# Patient Record
Sex: FEMALE | Race: WHITE | NOT HISPANIC OR LATINO | Employment: FULL TIME | URBAN - METROPOLITAN AREA
[De-identification: names, ages, dates, MRNs, and addresses within clinical notes are randomized per-mention and may not be internally consistent; named-entity substitution may affect disease eponyms.]

---

## 2017-09-16 ENCOUNTER — HOSPITAL ENCOUNTER (OUTPATIENT)
Dept: RADIOLOGY | Facility: MEDICAL CENTER | Age: 37
End: 2017-09-16
Attending: FAMILY MEDICINE
Payer: COMMERCIAL

## 2017-09-16 ENCOUNTER — OFFICE VISIT (OUTPATIENT)
Dept: URGENT CARE | Facility: CLINIC | Age: 37
End: 2017-09-16
Payer: COMMERCIAL

## 2017-09-16 ENCOUNTER — HOSPITAL ENCOUNTER (OUTPATIENT)
Facility: MEDICAL CENTER | Age: 37
End: 2017-09-16
Attending: FAMILY MEDICINE
Payer: COMMERCIAL

## 2017-09-16 VITALS
OXYGEN SATURATION: 97 % | TEMPERATURE: 97.8 F | SYSTOLIC BLOOD PRESSURE: 118 MMHG | BODY MASS INDEX: 40.73 KG/M2 | HEIGHT: 69 IN | RESPIRATION RATE: 18 BRPM | DIASTOLIC BLOOD PRESSURE: 70 MMHG | HEART RATE: 104 BPM | WEIGHT: 275 LBS

## 2017-09-16 DIAGNOSIS — R10.11 RUQ ABDOMINAL PAIN: ICD-10-CM

## 2017-09-16 DIAGNOSIS — R50.9 FEELS FEVERISH: ICD-10-CM

## 2017-09-16 DIAGNOSIS — R11.2 NAUSEA AND VOMITING, INTRACTABILITY OF VOMITING NOT SPECIFIED, UNSPECIFIED VOMITING TYPE: ICD-10-CM

## 2017-09-16 DIAGNOSIS — R19.7 DIARRHEA, UNSPECIFIED TYPE: ICD-10-CM

## 2017-09-16 LAB
ALBUMIN SERPL BCP-MCNC: 4.6 G/DL (ref 3.2–4.9)
ALBUMIN/GLOB SERPL: 1.5 G/DL
ALP SERPL-CCNC: 73 U/L (ref 30–99)
ALT SERPL-CCNC: 18 U/L (ref 2–50)
ANION GAP SERPL CALC-SCNC: 8 MMOL/L (ref 0–11.9)
APPEARANCE UR: CLEAR
AST SERPL-CCNC: 17 U/L (ref 12–45)
BASOPHILS # BLD AUTO: 0.6 % (ref 0–1.8)
BASOPHILS # BLD: 0.05 K/UL (ref 0–0.12)
BILIRUB SERPL-MCNC: 0.5 MG/DL (ref 0.1–1.5)
BILIRUB UR STRIP-MCNC: ABNORMAL MG/DL
BUN SERPL-MCNC: 13 MG/DL (ref 8–22)
CALCIUM SERPL-MCNC: 9.5 MG/DL (ref 8.5–10.5)
CHLORIDE SERPL-SCNC: 104 MMOL/L (ref 96–112)
CO2 SERPL-SCNC: 25 MMOL/L (ref 20–33)
COLOR UR AUTO: YELLOW
CREAT SERPL-MCNC: 0.76 MG/DL (ref 0.5–1.4)
EOSINOPHIL # BLD AUTO: 0.36 K/UL (ref 0–0.51)
EOSINOPHIL NFR BLD: 4.3 % (ref 0–6.9)
ERYTHROCYTE [DISTWIDTH] IN BLOOD BY AUTOMATED COUNT: 38.8 FL (ref 35.9–50)
GFR SERPL CREATININE-BSD FRML MDRD: >60 ML/MIN/1.73 M 2
GLOBULIN SER CALC-MCNC: 3 G/DL (ref 1.9–3.5)
GLUCOSE SERPL-MCNC: 98 MG/DL (ref 65–99)
GLUCOSE UR STRIP.AUTO-MCNC: ABNORMAL MG/DL
HCT VFR BLD AUTO: 41.5 % (ref 37–47)
HGB BLD-MCNC: 13.7 G/DL (ref 12–16)
IMM GRANULOCYTES # BLD AUTO: 0.03 K/UL (ref 0–0.11)
IMM GRANULOCYTES NFR BLD AUTO: 0.4 % (ref 0–0.9)
INT CON NEG: NEGATIVE
INT CON POS: POSITIVE
KETONES UR STRIP.AUTO-MCNC: 5 MG/DL
LEUKOCYTE ESTERASE UR QL STRIP.AUTO: ABNORMAL
LYMPHOCYTES # BLD AUTO: 2.49 K/UL (ref 1–4.8)
LYMPHOCYTES NFR BLD: 29.7 % (ref 22–41)
MCH RBC QN AUTO: 28.9 PG (ref 27–33)
MCHC RBC AUTO-ENTMCNC: 33 G/DL (ref 33.6–35)
MCV RBC AUTO: 87.6 FL (ref 81.4–97.8)
MONOCYTES # BLD AUTO: 0.51 K/UL (ref 0–0.85)
MONOCYTES NFR BLD AUTO: 6.1 % (ref 0–13.4)
NEUTROPHILS # BLD AUTO: 4.94 K/UL (ref 2–7.15)
NEUTROPHILS NFR BLD: 58.9 % (ref 44–72)
NITRITE UR QL STRIP.AUTO: ABNORMAL
NRBC # BLD AUTO: 0 K/UL
NRBC BLD AUTO-RTO: 0 /100 WBC
PH UR STRIP.AUTO: 8.5 [PH] (ref 5–8)
PLATELET # BLD AUTO: 308 K/UL (ref 164–446)
PMV BLD AUTO: 10.9 FL (ref 9–12.9)
POC URINE PREGNANCY TEST: NORMAL
POTASSIUM SERPL-SCNC: 4.3 MMOL/L (ref 3.6–5.5)
PROT SERPL-MCNC: 7.6 G/DL (ref 6–8.2)
PROT UR QL STRIP: 30 MG/DL
RBC # BLD AUTO: 4.74 M/UL (ref 4.2–5.4)
RBC UR QL AUTO: ABNORMAL
SODIUM SERPL-SCNC: 137 MMOL/L (ref 135–145)
SP GR UR STRIP.AUTO: 1
UROBILINOGEN UR STRIP-MCNC: ABNORMAL MG/DL
WBC # BLD AUTO: 8.4 K/UL (ref 4.8–10.8)

## 2017-09-16 PROCEDURE — 74177 CT ABD & PELVIS W/CONTRAST: CPT

## 2017-09-16 PROCEDURE — 81025 URINE PREGNANCY TEST: CPT | Performed by: FAMILY MEDICINE

## 2017-09-16 PROCEDURE — 99201 PR OFFICE/OUTPT VISIT,NEW,LEVL I: CPT | Performed by: FAMILY MEDICINE

## 2017-09-16 PROCEDURE — 99000 SPECIMEN HANDLING OFFICE-LAB: CPT | Performed by: FAMILY MEDICINE

## 2017-09-16 PROCEDURE — 80053 COMPREHEN METABOLIC PANEL: CPT

## 2017-09-16 PROCEDURE — 36415 COLL VENOUS BLD VENIPUNCTURE: CPT | Performed by: FAMILY MEDICINE

## 2017-09-16 PROCEDURE — 85025 COMPLETE CBC W/AUTO DIFF WBC: CPT

## 2017-09-16 PROCEDURE — 700117 HCHG RX CONTRAST REV CODE 255: Performed by: FAMILY MEDICINE

## 2017-09-16 PROCEDURE — 81002 URINALYSIS NONAUTO W/O SCOPE: CPT | Performed by: FAMILY MEDICINE

## 2017-09-16 RX ORDER — ONDANSETRON HYDROCHLORIDE 8 MG/1
8 TABLET, FILM COATED ORAL EVERY 8 HOURS PRN
Qty: 15 TAB | Refills: 0 | Status: SHIPPED | OUTPATIENT
Start: 2017-09-16 | End: 2021-10-13

## 2017-09-16 RX ORDER — CYCLOBENZAPRINE HCL 5 MG
5-10 TABLET ORAL 3 TIMES DAILY PRN
Qty: 30 TAB | Refills: 0 | Status: ON HOLD | OUTPATIENT
Start: 2017-09-16 | End: 2017-10-12

## 2017-09-16 RX ADMIN — IOHEXOL 100 ML: 350 INJECTION, SOLUTION INTRAVENOUS at 15:30

## 2017-09-16 RX ADMIN — IOHEXOL 100 ML: 240 INJECTION, SOLUTION INTRATHECAL; INTRAVASCULAR; INTRAVENOUS; ORAL at 15:30

## 2017-09-16 ASSESSMENT — PAIN SCALES - GENERAL: PAINLEVEL: 9=SEVERE PAIN

## 2017-09-16 NOTE — PROGRESS NOTES
Subjective:      Fiorella Davis is a 37 y.o. female who presents with RUQ Pain (diarrhea and vomiting, x a month but sometimes is very severe  )    Patient presents to urgent care with right upper quadrant pain off and on for about a month over the past week it has been more consistent and worse. She has altered bowel habits as soon as she eats she either vomits or has diarrhea and feels this right upper quadrant pain. She is recently lost some weigh on [employer]. She has felt feverish and chilled at times. Denies any previous history of any gallbladder problems. She is status post appendectomy. Denies any dysuria or hematuria no history of kidney stones.       HPI  ROS  PMH:  has a past medical history of Anxiety; Depression; and Urinary tract infection, site not specified.  MEDS:   Current Outpatient Prescriptions:   •  ClonazePAM (KLONOPIN PO), Take  by mouth., Disp: , Rfl:   •  Escitalopram Oxalate (LEXAPRO PO), Take  by mouth., Disp: , Rfl:   •  amphetamine-dextroamphetamine (ADDERALL, 20MG,) 20 MG TABS, Take 20 mg by mouth 2 times a day., Disp: , Rfl:   •  zolpidem (AMBIEN) 10 MG TABS, Take 10 mg by mouth at bedtime as needed for Sleep., Disp: , Rfl:   •  antipyrine-benzocaine (AURALGAN) otic solution, , Disp: , Rfl:   •  azithromycin (ZITHROMAX) 500 MG tablet, , Disp: , Rfl:   •  fluoxetine (PROZAC) 10 MG Cap, , Disp: , Rfl:   •  sertraline (ZOLOFT) 50 MG TABS, Take 50 mg by mouth every day., Disp: , Rfl:   •  alprazolam (XANAX) 0.5 MG TABS, Take 0.5 mg by mouth at bedtime as needed., Disp: , Rfl:   •  azithromycin (ZITHROMAX Z-JOSE EDUARDO) 250 MG TABS, Take 1 Tab by mouth every day. Take two tablets the first day.  Dana 2 tabletas la javi shalini.  Patient states that she doesn't have a reaction to Z-Jose Eduardo., Disp: 1 Each, Rfl: 0  •  guaifenesin-codeine (CHERATUSSIN AC) SOLN, Take 5 mL by mouth every four hours as needed for Cough., Disp: 180 mL, Rfl: 0  •  fluticasone (FLONASE) 50 MCG/ACT nasal spray, Spray 1  "Spray in nose every day. Each Nostril, Disp: 1 Bottle, Rfl: 0  •  cetirizine-psuedoephedrine (ZYRTEC-D) 5-120 MG per tablet, Take 1 Tab by mouth 2 times a day., Disp: 60 Tab, Rfl: 0  •  hydrocodone-acetaminophen (VICODIN) 5-500 MG TABS, Take 1 Tab by mouth every four hours as needed., Disp: 15 Each, Rfl: 0  •  benzonatate (TESSALON) 100 MG CAPS, Take 2 Caps by mouth 3 times a day as needed for Cough. On [employer] $4 Plan, Disp: 60 Cap, Rfl: 1  ALLERGIES:   Allergies   Allergen Reactions   • Ceclor [Cefaclor] Vomiting   • Erythromycin Nausea   • Pcn [Penicillins] Swelling   SURGHX:   Past Surgical History:   Procedure Laterality Date   • PRIMARY C SECTION  2014   • APPENDECTOMY  1998   • ACL REPAIR  1997   • KNEE ARTHROSCOPY     SOCHX:  reports that she has never smoked. She has never used smokeless tobacco. She reports that she drinks alcohol. She reports that she does not use drugs.  FH: Family history was reviewed, no pertinent findings to report     Objective:     /70   Pulse (!) 104   Temp 36.6 °C (97.8 °F)   Resp 18   Ht 1.753 m (5' 9\")   Wt 124.7 kg (275 lb)   SpO2 97%   Breastfeeding? No   BMI 40.61 kg/m²      Physical Exam      Diagnostics: urine dip prot, ph 8.5, tr blood, tr ketones                        Hcg negative  Assessment/Plan:         "

## 2017-09-20 ENCOUNTER — HOSPITAL ENCOUNTER (OUTPATIENT)
Dept: LAB | Facility: MEDICAL CENTER | Age: 37
End: 2017-09-20
Attending: PHYSICIAN ASSISTANT
Payer: COMMERCIAL

## 2017-09-20 LAB
BASOPHILS # BLD AUTO: 0.3 % (ref 0–1.8)
BASOPHILS # BLD: 0.03 K/UL (ref 0–0.12)
EOSINOPHIL # BLD AUTO: 0.09 K/UL (ref 0–0.51)
EOSINOPHIL NFR BLD: 0.9 % (ref 0–6.9)
ERYTHROCYTE [DISTWIDTH] IN BLOOD BY AUTOMATED COUNT: 37.8 FL (ref 35.9–50)
HCT VFR BLD AUTO: 40.6 % (ref 37–47)
HGB BLD-MCNC: 13.7 G/DL (ref 12–16)
IMM GRANULOCYTES # BLD AUTO: 0.04 K/UL (ref 0–0.11)
IMM GRANULOCYTES NFR BLD AUTO: 0.4 % (ref 0–0.9)
LYMPHOCYTES # BLD AUTO: 2.43 K/UL (ref 1–4.8)
LYMPHOCYTES NFR BLD: 25.3 % (ref 22–41)
MCH RBC QN AUTO: 29.9 PG (ref 27–33)
MCHC RBC AUTO-ENTMCNC: 33.7 G/DL (ref 33.6–35)
MCV RBC AUTO: 88.6 FL (ref 81.4–97.8)
MONOCYTES # BLD AUTO: 0.43 K/UL (ref 0–0.85)
MONOCYTES NFR BLD AUTO: 4.5 % (ref 0–13.4)
NEUTROPHILS # BLD AUTO: 6.57 K/UL (ref 2–7.15)
NEUTROPHILS NFR BLD: 68.6 % (ref 44–72)
NRBC # BLD AUTO: 0 K/UL
NRBC BLD AUTO-RTO: 0 /100 WBC
PLATELET # BLD AUTO: 311 K/UL (ref 164–446)
PMV BLD AUTO: 11.1 FL (ref 9–12.9)
RBC # BLD AUTO: 4.58 M/UL (ref 4.2–5.4)
WBC # BLD AUTO: 9.6 K/UL (ref 4.8–10.8)

## 2017-09-20 PROCEDURE — 83516 IMMUNOASSAY NONANTIBODY: CPT

## 2017-09-20 PROCEDURE — 82784 ASSAY IGA/IGD/IGG/IGM EACH: CPT

## 2017-09-20 PROCEDURE — 85025 COMPLETE CBC W/AUTO DIFF WBC: CPT

## 2017-09-20 PROCEDURE — 83690 ASSAY OF LIPASE: CPT

## 2017-09-20 PROCEDURE — 36415 COLL VENOUS BLD VENIPUNCTURE: CPT

## 2017-09-20 PROCEDURE — 80053 COMPREHEN METABOLIC PANEL: CPT

## 2017-09-21 ENCOUNTER — HOSPITAL ENCOUNTER (OUTPATIENT)
Facility: MEDICAL CENTER | Age: 37
End: 2017-09-21
Attending: PHYSICIAN ASSISTANT
Payer: COMMERCIAL

## 2017-09-21 ENCOUNTER — HOSPITAL ENCOUNTER (OUTPATIENT)
Dept: RADIOLOGY | Facility: MEDICAL CENTER | Age: 37
End: 2017-09-21
Attending: PHYSICIAN ASSISTANT
Payer: COMMERCIAL

## 2017-09-21 DIAGNOSIS — R10.11 ABDOMINAL PAIN, RIGHT UPPER QUADRANT: ICD-10-CM

## 2017-09-21 DIAGNOSIS — K52.9 INFLAMMATORY BOWEL DISEASE: ICD-10-CM

## 2017-09-21 DIAGNOSIS — R11.12 PROJECTILE VOMITING WITH NAUSEA: ICD-10-CM

## 2017-09-21 LAB
ALBUMIN SERPL BCP-MCNC: 4.8 G/DL (ref 3.2–4.9)
ALBUMIN/GLOB SERPL: 1.8 G/DL
ALP SERPL-CCNC: 66 U/L (ref 30–99)
ALT SERPL-CCNC: 17 U/L (ref 2–50)
ANION GAP SERPL CALC-SCNC: 8 MMOL/L (ref 0–11.9)
AST SERPL-CCNC: 18 U/L (ref 12–45)
BILIRUB SERPL-MCNC: 0.5 MG/DL (ref 0.1–1.5)
BUN SERPL-MCNC: 9 MG/DL (ref 8–22)
CALCIUM SERPL-MCNC: 9.4 MG/DL (ref 8.5–10.5)
CHLORIDE SERPL-SCNC: 103 MMOL/L (ref 96–112)
CO2 SERPL-SCNC: 25 MMOL/L (ref 20–33)
CREAT SERPL-MCNC: 0.87 MG/DL (ref 0.5–1.4)
GFR SERPL CREATININE-BSD FRML MDRD: >60 ML/MIN/1.73 M 2
GLOBULIN SER CALC-MCNC: 2.7 G/DL (ref 1.9–3.5)
GLUCOSE SERPL-MCNC: 79 MG/DL (ref 65–99)
LIPASE SERPL-CCNC: 7 U/L (ref 11–82)
POTASSIUM SERPL-SCNC: 3.8 MMOL/L (ref 3.6–5.5)
PROT SERPL-MCNC: 7.5 G/DL (ref 6–8.2)
SODIUM SERPL-SCNC: 136 MMOL/L (ref 135–145)

## 2017-09-21 PROCEDURE — 76700 US EXAM ABDOM COMPLETE: CPT

## 2017-09-21 PROCEDURE — 87329 GIARDIA AG IA: CPT

## 2017-09-21 PROCEDURE — 87328 CRYPTOSPORIDIUM AG IA: CPT

## 2017-09-21 PROCEDURE — 87493 C DIFF AMPLIFIED PROBE: CPT

## 2017-09-22 LAB
C DIFF DNA SPEC QL NAA+PROBE: NEGATIVE
C DIFF TOX GENS STL QL NAA+PROBE: NEGATIVE
G LAMBLIA+C PARVUM AG STL QL RAPID: NORMAL
IGA SERPL-MCNC: 178 MG/DL (ref 68–408)
SIGNIFICANT IND 70042: NORMAL
SOURCE SOURCE: NORMAL
TTG IGA SER IA-ACNC: 1 U/ML (ref 0–3)

## 2017-10-02 ENCOUNTER — HOSPITAL ENCOUNTER (OUTPATIENT)
Dept: RADIOLOGY | Facility: MEDICAL CENTER | Age: 37
End: 2017-10-02
Attending: PHYSICIAN ASSISTANT
Payer: COMMERCIAL

## 2017-10-02 DIAGNOSIS — R10.11 ABDOMINAL PAIN, RIGHT UPPER QUADRANT: ICD-10-CM

## 2017-10-02 PROCEDURE — 78226 HEPATOBILIARY SYSTEM IMAGING: CPT

## 2017-10-12 ENCOUNTER — HOSPITAL ENCOUNTER (OUTPATIENT)
Facility: MEDICAL CENTER | Age: 37
End: 2017-10-12
Attending: SURGERY | Admitting: SURGERY
Payer: COMMERCIAL

## 2017-10-12 VITALS
HEART RATE: 59 BPM | OXYGEN SATURATION: 95 % | TEMPERATURE: 97.7 F | WEIGHT: 251.32 LBS | HEIGHT: 69 IN | RESPIRATION RATE: 16 BRPM | SYSTOLIC BLOOD PRESSURE: 106 MMHG | BODY MASS INDEX: 37.22 KG/M2 | DIASTOLIC BLOOD PRESSURE: 64 MMHG

## 2017-10-12 PROBLEM — K82.8 BILIARY DYSKINESIA: Status: ACTIVE | Noted: 2017-10-12

## 2017-10-12 LAB — HCG SERPL QL: NEGATIVE

## 2017-10-12 PROCEDURE — 700102 HCHG RX REV CODE 250 W/ 637 OVERRIDE(OP)

## 2017-10-12 PROCEDURE — 160039 HCHG SURGERY MINUTES - EA ADDL 1 MIN LEVEL 3: Performed by: SURGERY

## 2017-10-12 PROCEDURE — 500697 HCHG HEMOCLIP, LARGE (ORANGE): Performed by: SURGERY

## 2017-10-12 PROCEDURE — 160036 HCHG PACU - EA ADDL 30 MINS PHASE I: Performed by: SURGERY

## 2017-10-12 PROCEDURE — 501399 HCHG SPECIMAN BAG, ENDO CATC: Performed by: SURGERY

## 2017-10-12 PROCEDURE — 160009 HCHG ANES TIME/MIN: Performed by: SURGERY

## 2017-10-12 PROCEDURE — 160028 HCHG SURGERY MINUTES - 1ST 30 MINS LEVEL 3: Performed by: SURGERY

## 2017-10-12 PROCEDURE — 501838 HCHG SUTURE GENERAL: Performed by: SURGERY

## 2017-10-12 PROCEDURE — A9270 NON-COVERED ITEM OR SERVICE: HCPCS

## 2017-10-12 PROCEDURE — 700111 HCHG RX REV CODE 636 W/ 250 OVERRIDE (IP)

## 2017-10-12 PROCEDURE — 160035 HCHG PACU - 1ST 60 MINS PHASE I: Performed by: SURGERY

## 2017-10-12 PROCEDURE — 501570 HCHG TROCAR, SEPARATOR: Performed by: SURGERY

## 2017-10-12 PROCEDURE — 160002 HCHG RECOVERY MINUTES (STAT): Performed by: SURGERY

## 2017-10-12 PROCEDURE — 700101 HCHG RX REV CODE 250

## 2017-10-12 PROCEDURE — 500002 HCHG ADHESIVE, DERMABOND: Performed by: SURGERY

## 2017-10-12 PROCEDURE — 502571 HCHG PACK, LAP CHOLE: Performed by: SURGERY

## 2017-10-12 PROCEDURE — 501577 HCHG TROCAR, STEP 11MM: Performed by: SURGERY

## 2017-10-12 PROCEDURE — 500515 HCHG ENDOCLOSE SUTURING DEVICE: Performed by: SURGERY

## 2017-10-12 PROCEDURE — 88304 TISSUE EXAM BY PATHOLOGIST: CPT

## 2017-10-12 PROCEDURE — 84703 CHORIONIC GONADOTROPIN ASSAY: CPT

## 2017-10-12 PROCEDURE — 160048 HCHG OR STATISTICAL LEVEL 1-5: Performed by: SURGERY

## 2017-10-12 RX ORDER — OXYCODONE HYDROCHLORIDE AND ACETAMINOPHEN 5; 325 MG/1; MG/1
1-2 TABLET ORAL EVERY 4 HOURS PRN
Qty: 40 TAB | Refills: 0 | Status: SHIPPED | OUTPATIENT
Start: 2017-10-12 | End: 2021-10-13

## 2017-10-12 RX ORDER — BUPIVACAINE HYDROCHLORIDE AND EPINEPHRINE 5; 5 MG/ML; UG/ML
INJECTION, SOLUTION EPIDURAL; INTRACAUDAL; PERINEURAL
Status: DISCONTINUED | OUTPATIENT
Start: 2017-10-12 | End: 2017-10-12 | Stop reason: HOSPADM

## 2017-10-12 RX ORDER — SODIUM CHLORIDE, SODIUM LACTATE, POTASSIUM CHLORIDE, CALCIUM CHLORIDE 600; 310; 30; 20 MG/100ML; MG/100ML; MG/100ML; MG/100ML
INJECTION, SOLUTION INTRAVENOUS CONTINUOUS
Status: DISCONTINUED | OUTPATIENT
Start: 2017-10-12 | End: 2017-10-12 | Stop reason: HOSPADM

## 2017-10-12 RX ORDER — OXYCODONE HCL 5 MG/5 ML
SOLUTION, ORAL ORAL
Status: COMPLETED
Start: 2017-10-12 | End: 2017-10-12

## 2017-10-12 RX ORDER — HALOPERIDOL 5 MG/ML
INJECTION INTRAMUSCULAR
Status: COMPLETED
Start: 2017-10-12 | End: 2017-10-12

## 2017-10-12 RX ORDER — BUPIVACAINE HYDROCHLORIDE AND EPINEPHRINE 5; 5 MG/ML; UG/ML
INJECTION, SOLUTION EPIDURAL; INTRACAUDAL; PERINEURAL
Status: DISCONTINUED
Start: 2017-10-12 | End: 2017-10-12 | Stop reason: HOSPADM

## 2017-10-12 RX ORDER — POLYETHYLENE GLYCOL 3350 17 G/17G
17 POWDER, FOR SOLUTION ORAL PRN
Qty: 1 BOTTLE | Status: SHIPPED | OUTPATIENT
Start: 2017-10-12 | End: 2021-10-13

## 2017-10-12 RX ADMIN — HYDROMORPHONE HYDROCHLORIDE 0.5 MG: 1 INJECTION, SOLUTION INTRAMUSCULAR; INTRAVENOUS; SUBCUTANEOUS at 13:45

## 2017-10-12 RX ADMIN — HYDROMORPHONE HYDROCHLORIDE 0.5 MG: 1 INJECTION, SOLUTION INTRAMUSCULAR; INTRAVENOUS; SUBCUTANEOUS at 13:40

## 2017-10-12 RX ADMIN — HYDROMORPHONE HYDROCHLORIDE 0.5 MG: 1 INJECTION, SOLUTION INTRAMUSCULAR; INTRAVENOUS; SUBCUTANEOUS at 13:30

## 2017-10-12 RX ADMIN — HALOPERIDOL LACTATE 1 MG: 5 INJECTION, SOLUTION INTRAMUSCULAR at 13:34

## 2017-10-12 RX ADMIN — OXYCODONE HYDROCHLORIDE 10 MG: 5 SOLUTION ORAL at 13:45

## 2017-10-12 RX ADMIN — SODIUM CHLORIDE, SODIUM LACTATE, POTASSIUM CHLORIDE, CALCIUM CHLORIDE 1000 ML: 600; 310; 30; 20 INJECTION, SOLUTION INTRAVENOUS at 11:30

## 2017-10-12 RX ADMIN — HYDROMORPHONE HYDROCHLORIDE 0.5 MG: 1 INJECTION, SOLUTION INTRAMUSCULAR; INTRAVENOUS; SUBCUTANEOUS at 13:35

## 2017-10-12 ASSESSMENT — PAIN SCALES - GENERAL
PAINLEVEL_OUTOF10: 6
PAINLEVEL_OUTOF10: 10
PAINLEVEL_OUTOF10: 10
PAINLEVEL_OUTOF10: 2
PAINLEVEL_OUTOF10: 2
PAINLEVEL_OUTOF10: 8
PAINLEVEL_OUTOF10: 2

## 2017-10-12 NOTE — OR SURGEON
Operative Report    PreOp Diagnosis: biliary dyskinesia    PostOp Diagnosis: same    Procedure(s):  CLARISA BY LAPAROSCOPY - Wound Class: Contaminated    Surgeon(s):  Greyson Slaughter M.D.    Anesthesiologist/Type of Anesthesia:  Anesthesiologist: Nirmal Doyle M.D./General    Surgical Staff:  Circulator: Namrata Johnson R.N.  Scrub Person: Idalmis Moore R.N.    Specimens:  gallbladder    Estimated Blood Loss: 5cc    Findings: Mildly inflamed GB removed with hemostasis and critical view achieved    Complications: none        10/12/2017 1:22 PM Greyson Slaughter

## 2017-10-12 NOTE — OR NURSING
1328 REC'D PT FROM OR WITH DR. CHANG, NO AIRWAY PRESENT, VSS, NO DISTRESS NOTED, BREATHING IS EVEN AND UNLABORED, PT C/O NAUSEA AND 10/10 PAIN AT THIS TIME.  4 LAP STABS TO ABDOMEN COVERED WITH DERMA BOND ARE CDI.     1330 PT MEDICATED WITH IV DILAUDID,     1335 MEDICATED WITH IV DILAUDID AND HALDOL FOR PAIN AND NAUSEA    1340  MEDICATED WITH IV DILAUDID FOR C/O ABDOMINAL PAIN 9.    1345  MEDICATED WITH IV AND PO PAIN MEDICATION FOR C/O PAIN 8.    1358  MOM KELLY TO BEDSIDE.      1417  S.O. NATALY TO BEDSIDE.    1511  UP GETTING DRESSED.    1528  DISCHARGED.  DISCHARGE INSTRUCTIONS GIVEN TO PATIENT AND HER S.O. NATALY.  A VERBAL UNDERSTANDING OF ALL INSTRUCTIONS WAS STATED.  PATIENT TAKING PO AND AMBULATING WITHOUT DIFFICULTY.  PATIENT STATES SHE IS READY TO GO HOME.  4 LAP STABS TO ABDOMEN REMAIN CDI.

## 2017-10-12 NOTE — DISCHARGE INSTRUCTIONS
ACTIVITY: Rest and take it easy for the first 24 hours.  A responsible adult is recommended to remain with you during that time.  It is normal to feel sleepy.  We encourage you to not do anything that requires balance, judgment or coordination.    MILD FLU-LIKE SYMPTOMS ARE NORMAL. YOU MAY EXPERIENCE GENERALIZED MUSCLE ACHES, THROAT IRRITATION, HEADACHE AND/OR SOME NAUSEA.    FOR 24 HOURS DO NOT:  Drive, operate machinery or run household appliances.  Drink beer or alcoholic beverages.   Make important decisions or sign legal documents.    SPECIAL INSTRUCTIONS: *NO HEAVY LIFTING FOR 4 WEEKS**    DIET: To avoid nausea, slowly advance diet as tolerated, avoiding spicy or greasy foods for the first day.  Add more substantial food to your diet according to your physician's instructions.  Babies can be fed formula or breast milk as soon as they are hungry.  INCREASE FLUIDS AND FIBER TO AVOID CONSTIPATION.    SURGICAL DRESSING/BATHING: *MAY SHOWER TOMORROW.*    FOLLOW-UP APPOINTMENT:  A follow-up appointment should be arranged with your doctor in *FOLLOW UP WITH DR. AGUILA**; call to schedule.    You should CALL YOUR PHYSICIAN if you develop:  Fever greater than 101 degrees F.  Pain not relieved by medication, or persistent nausea or vomiting.  Excessive bleeding (blood soaking through dressing) or unexpected drainage from the wound.  Extreme redness or swelling around the incision site, drainage of pus or foul smelling drainage.  Inability to urinate or empty your bladder within 8 hours.  Problems with breathing or chest pain.    You should call 911 if you develop problems with breathing or chest pain.  If you are unable to contact your doctor or surgical center, you should go to the nearest emergency room or urgent care center.  Physician's telephone #: **[phone]*    If any questions arise, call your doctor.  If your doctor is not available, please feel free to call the Surgical Center at [phone].  The Center  is open Monday through Friday from 7AM to 7PM.  You can also call the HEALTH HOTLINE open 24 hours/day, 7 days/week and speak to a nurse at [phone], or toll free at [phone].    A registered nurse may call you a few days after your surgery to see how you are doing after your procedure.    MEDICATIONS: Resume taking daily medication.  Take prescribed pain medication with food.  If no medication is prescribed, you may take non-aspirin pain medication if needed.  PAIN MEDICATION CAN BE VERY CONSTIPATING.  Take a stool softener or laxative such as senokot, pericolace, or milk of magnesia if needed.    Prescription given for **PERCOCET & MIRALAX*.  Last pain medication given at *1:45 PM**.    If your physician has prescribed pain medication that includes Acetaminophen (Tylenol), do not take additional Acetaminophen (Tylenol) while taking the prescribed medication.    Depression / Suicide Risk    As you are discharged from this [facility] facility, it is important to learn how to keep safe from harming yourself.    Recognize the warning signs:  · Abrupt changes in personality, positive or negative- including increase in energy   · Giving away possessions  · Change in eating patterns- significant weight changes-  positive or negative  · Change in sleeping patterns- unable to sleep or sleeping all the time   · Unwillingness or inability to communicate  · Depression  · Unusual sadness, discouragement and loneliness  · Talk of wanting to die  · Neglect of personal appearance   · Rebelliousness- reckless behavior  · Withdrawal from people/activities they love  · Confusion- inability to concentrate     If you or a loved one observes any of these behaviors or has concerns about self-harm, here's what you can do:  · Talk about it- your feelings and reasons for harming yourself  · Remove any means that you might use to hurt yourself (examples: pills, rope, extension cords, firearm)  · Get professional help from the  community (Mental Health, Substance Abuse, psychological counseling)  · Do not be alone:Call your Safe Contact- someone whom you trust who will be there for you.  · Call your local CRISIS HOTLINE 720-0931 or 635-233-3347  · Call your local Children's Mobile Crisis Response Team [place] (366) 626-0263 or www.[employer]  · Call the toll free National Suicide Prevention Hotlines   · National Suicide Prevention Lifeline 150-442-EXVR (0634)  [place] [facility] 800-SUICIDE (724-6302)Discharge Education for patients on MACHO (Obstructive Sleep Apnea) Protocol    Prior to receiving sedation or anesthesia, we screen all patients for Obstructive Sleep Apnea.  During your screening, you were identified as having suspected, but not confirmed Obstructive Sleep Apnea(MACHO).    What is Obstructive Sleep Apnea?  Sleep apnea (AP-ne-ah) is a common disorder which involves breathing pauses that occur during sleep.  These can last from 10 seconds to a minute or longer.  Normal breathing resumes often with a loud snort or choking sound.    Sleep apnea occurs in all age groups and both genders but is more common in men and people over 40 years of age.  It has been estimated that as many as 18 million Americans have sleep apnea.  Most people who have sleep apnea don’t know they have it because it only occurs during sleep.  A family member and/or bed partner may first notice the signs of sleep apnea.  Sleep apnea is a chronic (ongoing) condition that disrupts the quality and quantity of your sleep repeatedly throughout the night.  This often results in excessive daytime sleepiness or fatigue during the day.  It may also contribute to high blood pressure, heart problems, and complications following medications used for surgery and procedures.    To establish a definitive diagnosis, further testing from a specialist would be needed.  We recommend that you follow up with your primary care physician.    We recommend that you  should be with an adult observer for at least 24 hours after your sedation/anesthesia.  If you have a CPAP machine, you should wear it during any sleep period (day or night) for the week following your procedure.  We encourage you to sleep on your side or in a sitting position, even with napping.  Lying flat on your back increases the risk of apnea and airway obstruction during your post procedure recovery period.    It is important to prevent over-sedation that could increase your risk for apnea.  Please take all pain medication as directed by your physician.  If you are not getting pain relief, please contact your physician to discuss possible approaches to relieving pain while minimizing medications that can affect your breathing and oxygen levels.      ·

## 2017-10-12 NOTE — OP REPORT
DATE OF SERVICE:  10/12/2017    DATE OF OPERATION:  10/12/2017.    PREOPERATIVE DIAGNOSIS:  Biliary dyskinesia.    POSTOPERATIVE DIAGNOSIS:  Biliary dyskinesia.    PROCEDURE:  Laparoscopic cholecystectomy.    SURGEON:  Greyson Slaughter MD    ASSISTANT:  WILEY Ferrari    ANESTHESIA:  General endotracheal.    ANESTHESIOLOGIST:  Dr. Doyle.    ESTIMATED BLOOD LOSS:  5 mL.    SPECIMENS:  Gallbladder.    COMPLICATIONS:  None.    CONDITION:  Stable.    INDICATIONS FOR PROCEDURE:  This is a 37-year-old female with right upper   quadrant pain, which is worse with fatty meals.  She has a low ejection   fraction on HIDA scan and was diagnosed with biliary dyskinesia.  Risks,   benefits, and alternatives of laparoscopic cholecystectomy were explained to   her before she presented for elective surgery.    OPERATIVE FINDINGS:  Mildly inflamed gallbladder removed with hemostasis and   critical view achieved.    OPERATIVE TECHNIQUE:  After informed consent was obtained, the patient was   taken to the operating room, placed in the supine position.  After adequate   endotracheal anesthesia was achieved, the patient's abdomen was prepped and   draped in a sterile fashion.  Operation was begun by placing a 5 mm   periumbilical incision through which a 5 mm trocar was introduced into the   abdomen using Optiview technique.  After pneumoperitoneum was achieved,   additional trocars were placed, 12 mm in the upper epigastric midline and two   5 mm trocars in the right upper quadrant.  All trocars were placed with 0.5%   Marcaine with epinephrine for local anesthesia.    Patient was positioned and the gallbladder was grasped and retracted   superiorly and laterally.  We began by taking down some omental adhesions from   the gallbladder wall and then dissected in the area of the cystic triangle   until the cystic duct and artery were identified.  These were clipped and   divided with the critical view being achieved and cautery was used  to finish   removing the gallbladder from the gallbladder fossa.  There was some spillage   of bile with this maneuver.  The gallbladder was placed in an EndoCatch bag   and removed after dilation from the 12 mm trocar site.    The right upper quadrant was irrigated and noted to be hemostatic after   additional application of cautery to the liver.  All spilled bile was   irrigated and removed.  The extraction site was closed with an 0 PDS using an   Endoclose device.  Pneumoperitoneum was reduced and all trocars were removed.    Skin incisions were closed with 4-0 Monocryl.  Dermabond was placed.  The   patient returned to the PACU in stable condition.  All instruments counts were   correct at the end of the procedure.       ____________________________________     MD MACI Bonilla / MARCELO    DD:  10/12/2017 13:27:18  DT:  10/12/2017 15:47:55    D#:  [id number]  Job#:  [id number]

## 2017-11-22 ENCOUNTER — HOSPITAL ENCOUNTER (OUTPATIENT)
Dept: LAB | Facility: MEDICAL CENTER | Age: 37
End: 2017-11-22
Attending: PHYSICIAN ASSISTANT
Payer: COMMERCIAL

## 2017-11-22 LAB
BASOPHILS # BLD AUTO: 0.5 % (ref 0–1.8)
BASOPHILS # BLD: 0.05 K/UL (ref 0–0.12)
EOSINOPHIL # BLD AUTO: 0.12 K/UL (ref 0–0.51)
EOSINOPHIL NFR BLD: 1.1 % (ref 0–6.9)
ERYTHROCYTE [DISTWIDTH] IN BLOOD BY AUTOMATED COUNT: 42.5 FL (ref 35.9–50)
HCT VFR BLD AUTO: 42.9 % (ref 37–47)
HGB BLD-MCNC: 14.1 G/DL (ref 12–16)
IMM GRANULOCYTES # BLD AUTO: 0.04 K/UL (ref 0–0.11)
IMM GRANULOCYTES NFR BLD AUTO: 0.4 % (ref 0–0.9)
LYMPHOCYTES # BLD AUTO: 2.62 K/UL (ref 1–4.8)
LYMPHOCYTES NFR BLD: 24.5 % (ref 22–41)
MCH RBC QN AUTO: 29.3 PG (ref 27–33)
MCHC RBC AUTO-ENTMCNC: 32.9 G/DL (ref 33.6–35)
MCV RBC AUTO: 89 FL (ref 81.4–97.8)
MONOCYTES # BLD AUTO: 0.63 K/UL (ref 0–0.85)
MONOCYTES NFR BLD AUTO: 5.9 % (ref 0–13.4)
NEUTROPHILS # BLD AUTO: 7.25 K/UL (ref 2–7.15)
NEUTROPHILS NFR BLD: 67.6 % (ref 44–72)
NRBC # BLD AUTO: 0 K/UL
NRBC BLD AUTO-RTO: 0 /100 WBC
PLATELET # BLD AUTO: 267 K/UL (ref 164–446)
PMV BLD AUTO: 11.2 FL (ref 9–12.9)
RBC # BLD AUTO: 4.82 M/UL (ref 4.2–5.4)
WBC # BLD AUTO: 10.7 K/UL (ref 4.8–10.8)

## 2017-11-22 PROCEDURE — 36415 COLL VENOUS BLD VENIPUNCTURE: CPT

## 2017-11-22 PROCEDURE — 85025 COMPLETE CBC W/AUTO DIFF WBC: CPT

## 2017-11-22 PROCEDURE — 86140 C-REACTIVE PROTEIN: CPT

## 2017-11-22 PROCEDURE — 80053 COMPREHEN METABOLIC PANEL: CPT

## 2017-11-23 LAB
ALBUMIN SERPL BCP-MCNC: 4.4 G/DL (ref 3.2–4.9)
ALBUMIN/GLOB SERPL: 1.4 G/DL
ALP SERPL-CCNC: 71 U/L (ref 30–99)
ALT SERPL-CCNC: 20 U/L (ref 2–50)
ANION GAP SERPL CALC-SCNC: 11 MMOL/L (ref 0–11.9)
AST SERPL-CCNC: 23 U/L (ref 12–45)
BILIRUB SERPL-MCNC: 0.7 MG/DL (ref 0.1–1.5)
BUN SERPL-MCNC: 7 MG/DL (ref 8–22)
CALCIUM SERPL-MCNC: 9.7 MG/DL (ref 8.5–10.5)
CHLORIDE SERPL-SCNC: 102 MMOL/L (ref 96–112)
CO2 SERPL-SCNC: 24 MMOL/L (ref 20–33)
CREAT SERPL-MCNC: 0.79 MG/DL (ref 0.5–1.4)
CRP SERPL HS-MCNC: 0.84 MG/DL (ref 0–0.75)
GFR SERPL CREATININE-BSD FRML MDRD: >60 ML/MIN/1.73 M 2
GLOBULIN SER CALC-MCNC: 3.1 G/DL (ref 1.9–3.5)
GLUCOSE SERPL-MCNC: 88 MG/DL (ref 65–99)
POTASSIUM SERPL-SCNC: 3.9 MMOL/L (ref 3.6–5.5)
PROT SERPL-MCNC: 7.5 G/DL (ref 6–8.2)
SODIUM SERPL-SCNC: 137 MMOL/L (ref 135–145)

## 2018-02-21 ENCOUNTER — HOSPITAL ENCOUNTER (OUTPATIENT)
Dept: HOSPITAL 8 - PETCFH | Age: 38
Discharge: HOME | End: 2018-02-21
Attending: PHYSICIAN ASSISTANT
Payer: MEDICARE

## 2018-02-21 DIAGNOSIS — R68.81: ICD-10-CM

## 2018-02-21 DIAGNOSIS — K52.9: Primary | ICD-10-CM

## 2018-02-21 PROCEDURE — 78264 GASTRIC EMPTYING IMG STUDY: CPT

## 2018-02-21 PROCEDURE — A9541 TC99M SULFUR COLLOID: HCPCS

## 2021-10-12 PROBLEM — M21.611 BUNION, RIGHT FOOT: Status: ACTIVE | Noted: 2021-10-12

## 2021-11-19 ENCOUNTER — HOSPITAL ENCOUNTER (OUTPATIENT)
Facility: MEDICAL CENTER | Age: 41
End: 2021-11-19
Attending: ANESTHESIOLOGY
Payer: COMMERCIAL

## 2021-11-19 LAB
COVID ORDER STATUS COVID19: NORMAL
SARS-COV-2 RNA RESP QL NAA+PROBE: NOTDETECTED
SPECIMEN SOURCE: NORMAL

## 2021-11-19 PROCEDURE — U0003 INFECTIOUS AGENT DETECTION BY NUCLEIC ACID (DNA OR RNA); SEVERE ACUTE RESPIRATORY SYNDROME CORONAVIRUS 2 (SARS-COV-2) (CORONAVIRUS DISEASE [COVID-19]), AMPLIFIED PROBE TECHNIQUE, MAKING USE OF HIGH THROUGHPUT TECHNOLOGIES AS DESCRIBED BY CMS-2020-01-R: HCPCS

## 2021-11-19 PROCEDURE — U0005 INFEC AGEN DETEC AMPLI PROBE: HCPCS

## 2024-02-10 ENCOUNTER — HOSPITAL ENCOUNTER (OUTPATIENT)
Dept: LAB | Facility: MEDICAL CENTER | Age: 44
End: 2024-02-10
Attending: INTERNAL MEDICINE
Payer: COMMERCIAL

## 2024-02-10 LAB
25(OH)D3 SERPL-MCNC: 35 NG/ML (ref 30–100)
T3FREE SERPL-MCNC: 2.7 PG/ML (ref 2–4.4)
T4 FREE SERPL-MCNC: 1.27 NG/DL (ref 0.93–1.7)
THYROPEROXIDASE AB SERPL-ACNC: 194 IU/ML (ref 0–9)
TSH SERPL DL<=0.005 MIU/L-ACNC: 0.75 UIU/ML (ref 0.38–5.33)
VIT B12 SERPL-MCNC: 299 PG/ML (ref 211–911)

## 2024-02-10 PROCEDURE — 86376 MICROSOMAL ANTIBODY EACH: CPT

## 2024-02-10 PROCEDURE — 84443 ASSAY THYROID STIM HORMONE: CPT

## 2024-02-10 PROCEDURE — 82607 VITAMIN B-12: CPT

## 2024-02-10 PROCEDURE — 84481 FREE ASSAY (FT-3): CPT

## 2024-02-10 PROCEDURE — 84439 ASSAY OF FREE THYROXINE: CPT

## 2024-02-10 PROCEDURE — 36415 COLL VENOUS BLD VENIPUNCTURE: CPT

## 2024-02-10 PROCEDURE — 82306 VITAMIN D 25 HYDROXY: CPT

## 2024-04-12 ENCOUNTER — HOSPITAL ENCOUNTER (OUTPATIENT)
Dept: LAB | Facility: MEDICAL CENTER | Age: 44
End: 2024-04-12
Attending: INTERNAL MEDICINE
Payer: COMMERCIAL

## 2024-04-12 LAB
25(OH)D3 SERPL-MCNC: 46 NG/ML (ref 30–100)
T3FREE SERPL-MCNC: 2.71 PG/ML (ref 2–4.4)
T4 FREE SERPL-MCNC: 1.5 NG/DL (ref 0.93–1.7)
THYROPEROXIDASE AB SERPL-ACNC: 198 IU/ML (ref 0–9)
TSH SERPL DL<=0.005 MIU/L-ACNC: 1.2 UIU/ML (ref 0.38–5.33)
VIT B12 SERPL-MCNC: 462 PG/ML (ref 211–911)

## 2024-04-12 PROCEDURE — 84439 ASSAY OF FREE THYROXINE: CPT

## 2024-04-12 PROCEDURE — 86376 MICROSOMAL ANTIBODY EACH: CPT

## 2024-04-12 PROCEDURE — 82306 VITAMIN D 25 HYDROXY: CPT

## 2024-04-12 PROCEDURE — 84443 ASSAY THYROID STIM HORMONE: CPT

## 2024-04-12 PROCEDURE — 84481 FREE ASSAY (FT-3): CPT

## 2024-04-12 PROCEDURE — 82607 VITAMIN B-12: CPT

## 2024-04-12 PROCEDURE — 36415 COLL VENOUS BLD VENIPUNCTURE: CPT

## 2024-07-02 ENCOUNTER — HOSPITAL ENCOUNTER (OUTPATIENT)
Dept: LAB | Facility: MEDICAL CENTER | Age: 44
End: 2024-07-02
Attending: INTERNAL MEDICINE
Payer: COMMERCIAL

## 2024-07-02 LAB
25(OH)D3 SERPL-MCNC: 44 NG/ML (ref 30–100)
T3FREE SERPL-MCNC: 3.1 PG/ML (ref 2–4.4)
T4 FREE SERPL-MCNC: 1.23 NG/DL (ref 0.93–1.7)
TSH SERPL DL<=0.005 MIU/L-ACNC: 1.04 UIU/ML (ref 0.38–5.33)
VIT B12 SERPL-MCNC: 444 PG/ML (ref 211–911)

## 2024-07-02 PROCEDURE — 82607 VITAMIN B-12: CPT

## 2024-07-02 PROCEDURE — 36415 COLL VENOUS BLD VENIPUNCTURE: CPT

## 2024-07-02 PROCEDURE — 84439 ASSAY OF FREE THYROXINE: CPT

## 2024-07-02 PROCEDURE — 84443 ASSAY THYROID STIM HORMONE: CPT

## 2024-07-02 PROCEDURE — 84481 FREE ASSAY (FT-3): CPT

## 2024-07-02 PROCEDURE — 82306 VITAMIN D 25 HYDROXY: CPT

## 2024-09-03 ENCOUNTER — OFFICE VISIT (OUTPATIENT)
Dept: URGENT CARE | Facility: CLINIC | Age: 44
End: 2024-09-03
Payer: COMMERCIAL

## 2024-09-03 VITALS
BODY MASS INDEX: 42.21 KG/M2 | RESPIRATION RATE: 20 BRPM | SYSTOLIC BLOOD PRESSURE: 132 MMHG | WEIGHT: 285 LBS | HEIGHT: 69 IN | DIASTOLIC BLOOD PRESSURE: 80 MMHG | OXYGEN SATURATION: 96 % | TEMPERATURE: 98.3 F | HEART RATE: 69 BPM

## 2024-09-03 DIAGNOSIS — J02.9 PHARYNGITIS, UNSPECIFIED ETIOLOGY: ICD-10-CM

## 2024-09-03 DIAGNOSIS — K12.0 CANKER SORE: ICD-10-CM

## 2024-09-03 PROCEDURE — 3079F DIAST BP 80-89 MM HG: CPT | Performed by: FAMILY MEDICINE

## 2024-09-03 PROCEDURE — 3075F SYST BP GE 130 - 139MM HG: CPT | Performed by: FAMILY MEDICINE

## 2024-09-03 PROCEDURE — 99213 OFFICE O/P EST LOW 20 MIN: CPT | Performed by: FAMILY MEDICINE

## 2024-09-03 RX ORDER — DOXYCYCLINE HYCLATE 100 MG
100 TABLET ORAL 2 TIMES DAILY
Qty: 14 TABLET | Refills: 0 | Status: SHIPPED | OUTPATIENT
Start: 2024-09-03 | End: 2024-09-10

## 2024-09-03 ASSESSMENT — ENCOUNTER SYMPTOMS
EYES NEGATIVE: 1
GASTROINTESTINAL NEGATIVE: 1
CONSTITUTIONAL NEGATIVE: 1
RESPIRATORY NEGATIVE: 1
CARDIOVASCULAR NEGATIVE: 1

## 2024-09-04 NOTE — PROGRESS NOTES
"Subjective:   Fiorella Davis is a 44 y.o. female who presents for Pharyngitis (4 days and rash )      Canker sores in mouth, and side of the tongue.    Pharyngitis         Review of Systems   Constitutional: Negative.    HENT: Negative.     Eyes: Negative.    Respiratory: Negative.     Cardiovascular: Negative.    Gastrointestinal: Negative.    Genitourinary: Negative.    Skin: Negative.        Medications, Allergies, and current problem list reviewed today in Epic.     Objective:     /80   Pulse 69   Temp 36.8 °C (98.3 °F) (Temporal)   Resp 20   Ht 1.753 m (5' 9\")   Wt (!) 129 kg (285 lb)   SpO2 96%     Physical Exam  Vitals and nursing note reviewed.   Constitutional:       Appearance: Normal appearance.   HENT:      Head: Normocephalic and atraumatic.      Right Ear: Tympanic membrane normal.      Left Ear: Tympanic membrane normal.      Nose: Nose normal.      Mouth/Throat:      Comments: Multiple sores in mouth, painful  Eyes:      Extraocular Movements: Extraocular movements intact.      Pupils: Pupils are equal, round, and reactive to light.   Cardiovascular:      Rate and Rhythm: Normal rate and regular rhythm.      Pulses: Normal pulses.      Heart sounds: Normal heart sounds.   Pulmonary:      Effort: Pulmonary effort is normal.      Breath sounds: Normal breath sounds.   Abdominal:      General: Abdomen is flat.      Palpations: Abdomen is soft.   Neurological:      Mental Status: She is alert.         Assessment/Plan:     Diagnosis and associated orders:     1. Canker sore  doxycycline (VIBRAMYCIN) 100 MG Tab      2. Pharyngitis, unspecified etiology  doxycycline (VIBRAMYCIN) 100 MG Tab         Comments/MDM:              Differential diagnosis, natural history, supportive care, and indications for immediate follow-up discussed.    Advised the patient to follow-up with the primary care physician for recheck, reevaluation, and consideration of further management.    Please note that " this dictation was created using voice recognition software. I have made a reasonable attempt to correct obvious errors, but I expect that there are errors of grammar and possibly content that I did not discover before finalizing the note.    This note was electronically signed by Filemon Bacon M.D.

## 2024-10-04 ENCOUNTER — HOSPITAL ENCOUNTER (OUTPATIENT)
Dept: LAB | Facility: MEDICAL CENTER | Age: 44
End: 2024-10-04
Attending: INTERNAL MEDICINE
Payer: COMMERCIAL

## 2024-10-04 LAB
25(OH)D3 SERPL-MCNC: 35 NG/ML (ref 30–100)
T3FREE SERPL-MCNC: 2.95 PG/ML (ref 2–4.4)
T4 FREE SERPL-MCNC: 1.13 NG/DL (ref 0.93–1.7)
TSH SERPL DL<=0.005 MIU/L-ACNC: 0.92 UIU/ML (ref 0.38–5.33)
VIT B12 SERPL-MCNC: 458 PG/ML (ref 211–911)

## 2024-10-04 PROCEDURE — 82607 VITAMIN B-12: CPT

## 2024-10-04 PROCEDURE — 36415 COLL VENOUS BLD VENIPUNCTURE: CPT

## 2024-10-04 PROCEDURE — 82306 VITAMIN D 25 HYDROXY: CPT

## 2024-10-04 PROCEDURE — 84481 FREE ASSAY (FT-3): CPT

## 2024-10-04 PROCEDURE — 84443 ASSAY THYROID STIM HORMONE: CPT

## 2024-10-04 PROCEDURE — 84439 ASSAY OF FREE THYROXINE: CPT

## 2024-12-16 ENCOUNTER — HOSPITAL ENCOUNTER (OUTPATIENT)
Dept: LAB | Facility: MEDICAL CENTER | Age: 44
End: 2024-12-16
Attending: INTERNAL MEDICINE
Payer: COMMERCIAL

## 2024-12-16 LAB
25(OH)D3 SERPL-MCNC: 33 NG/ML (ref 30–100)
T3FREE SERPL-MCNC: 4.02 PG/ML (ref 2–4.4)
T4 FREE SERPL-MCNC: 1.16 NG/DL (ref 0.93–1.7)
TSH SERPL DL<=0.005 MIU/L-ACNC: 1.07 UIU/ML (ref 0.38–5.33)

## 2024-12-16 PROCEDURE — 82306 VITAMIN D 25 HYDROXY: CPT

## 2024-12-16 PROCEDURE — 84443 ASSAY THYROID STIM HORMONE: CPT

## 2024-12-16 PROCEDURE — 84481 FREE ASSAY (FT-3): CPT

## 2024-12-16 PROCEDURE — 84439 ASSAY OF FREE THYROXINE: CPT

## 2024-12-16 PROCEDURE — 36415 COLL VENOUS BLD VENIPUNCTURE: CPT

## 2025-04-01 ENCOUNTER — HOSPITAL ENCOUNTER (OUTPATIENT)
Facility: MEDICAL CENTER | Age: 45
End: 2025-04-01
Attending: INTERNAL MEDICINE
Payer: COMMERCIAL

## 2025-04-01 LAB
25(OH)D3 SERPL-MCNC: 36 NG/ML (ref 30–100)
DHEA-S SERPL-MCNC: 81.1 UG/DL (ref 60.9–337)
ESTRADIOL SERPL-MCNC: 48.3 PG/ML
FSH SERPL-ACNC: 15.3 MIU/ML
LH SERPL-ACNC: 8.4 IU/L
T3FREE SERPL-MCNC: 2.9 PG/ML (ref 2–4.4)
T4 FREE SERPL-MCNC: 1.3 NG/DL (ref 0.93–1.7)
TESTOST SERPL-MCNC: 17 NG/DL (ref 9–75)
TSH SERPL DL<=0.005 MIU/L-ACNC: 2.28 UIU/ML (ref 0.38–5.33)

## 2025-04-01 PROCEDURE — 84270 ASSAY OF SEX HORMONE GLOBUL: CPT

## 2025-04-01 PROCEDURE — 83002 ASSAY OF GONADOTROPIN (LH): CPT

## 2025-04-01 PROCEDURE — 84403 ASSAY OF TOTAL TESTOSTERONE: CPT

## 2025-04-01 PROCEDURE — 83001 ASSAY OF GONADOTROPIN (FSH): CPT

## 2025-04-01 PROCEDURE — 82626 DEHYDROEPIANDROSTERONE: CPT

## 2025-04-01 PROCEDURE — 82157 ASSAY OF ANDROSTENEDIONE: CPT

## 2025-04-01 PROCEDURE — 82670 ASSAY OF TOTAL ESTRADIOL: CPT

## 2025-04-01 PROCEDURE — 84439 ASSAY OF FREE THYROXINE: CPT

## 2025-04-01 PROCEDURE — 36415 COLL VENOUS BLD VENIPUNCTURE: CPT

## 2025-04-01 PROCEDURE — 84443 ASSAY THYROID STIM HORMONE: CPT

## 2025-04-01 PROCEDURE — 82627 DEHYDROEPIANDROSTERONE: CPT

## 2025-04-01 PROCEDURE — 84481 FREE ASSAY (FT-3): CPT

## 2025-04-01 PROCEDURE — 82306 VITAMIN D 25 HYDROXY: CPT

## 2025-04-02 LAB — SHBG SERPL-SCNC: 46 NMOL/L (ref 25–122)

## 2025-04-03 LAB
ANDROST SERPL-MCNC: 0.56 NG/ML (ref 0.13–0.82)
DHEA SERPL-MCNC: 3.92 NG/ML (ref 0.63–4.7)

## 2025-07-05 ENCOUNTER — HOSPITAL ENCOUNTER (OUTPATIENT)
Dept: LAB | Facility: MEDICAL CENTER | Age: 45
End: 2025-07-05
Attending: INTERNAL MEDICINE
Payer: COMMERCIAL

## 2025-07-05 LAB
25(OH)D3 SERPL-MCNC: 44 NG/ML (ref 30–100)
FSH SERPL-ACNC: 5 MIU/ML
LH SERPL-ACNC: 10.3 IU/L
T3FREE SERPL-MCNC: 2.77 PG/ML (ref 2–4.4)
T4 FREE SERPL-MCNC: 1.07 NG/DL (ref 0.93–1.7)
TSH SERPL-ACNC: 1.23 UIU/ML (ref 0.38–5.33)
VIT B12 SERPL-MCNC: 390 PG/ML (ref 211–911)

## 2025-07-05 PROCEDURE — 83002 ASSAY OF GONADOTROPIN (LH): CPT

## 2025-07-05 PROCEDURE — 84481 FREE ASSAY (FT-3): CPT

## 2025-07-05 PROCEDURE — 84443 ASSAY THYROID STIM HORMONE: CPT

## 2025-07-05 PROCEDURE — 84439 ASSAY OF FREE THYROXINE: CPT

## 2025-07-05 PROCEDURE — 82306 VITAMIN D 25 HYDROXY: CPT

## 2025-07-05 PROCEDURE — 84270 ASSAY OF SEX HORMONE GLOBUL: CPT

## 2025-07-05 PROCEDURE — 36415 COLL VENOUS BLD VENIPUNCTURE: CPT

## 2025-07-05 PROCEDURE — 82607 VITAMIN B-12: CPT

## 2025-07-05 PROCEDURE — 83001 ASSAY OF GONADOTROPIN (FSH): CPT

## 2025-07-05 PROCEDURE — 84403 ASSAY OF TOTAL TESTOSTERONE: CPT

## 2025-07-07 LAB — SHBG SERPL-SCNC: 53 NMOL/L (ref 25–122)

## 2025-07-09 LAB — TESTOST SERPL-MCNC: 22 NG/DL (ref 9–55)

## 2025-09-23 ENCOUNTER — APPOINTMENT (OUTPATIENT)
Dept: RADIOLOGY | Facility: MEDICAL CENTER | Age: 45
End: 2025-09-23
Attending: INTERNAL MEDICINE
Payer: COMMERCIAL

## (undated) DEVICE — SENSOR SPO2 NEO LNCS ADHESIVE (20/BX) SEE USER NOTES

## (undated) DEVICE — TROCAR Z THREAD11MM OPTICAL - NON BLADED(6/BX)

## (undated) DEVICE — PACK LAP CHOLE OR - (2EA/CA)

## (undated) DEVICE — DEVICE SUTURING NON-SAFETY ENDO CLOSE (12/BX)

## (undated) DEVICE — BANDAID SHEER STRIP 3/4 IN (100EA/BX 12BX/CA)

## (undated) DEVICE — CLIP MED LG INTNL HRZN TI ESCP - (20/BX)

## (undated) DEVICE — CANISTER SUCTION 3000ML MECHANICAL FILTER AUTO SHUTOFF MEDI-VAC NONSTERILE LF DISP  (40EA/CA)

## (undated) DEVICE — GLOVE BIOGEL SZ 7 SURGICAL PF LTX - (50PR/BX 4BX/CA)

## (undated) DEVICE — SUTURE GENERAL

## (undated) DEVICE — GLOVE BIOGEL PI INDICATOR SZ 6.5 SURGICAL PF LF - (50/BX 4BX/CA)

## (undated) DEVICE — PROTECTOR ULNA NERVE - (36PR/CA)

## (undated) DEVICE — CANNULA W/SEAL 5X100 Z-THRE - ADED KII (12/BX)

## (undated) DEVICE — SUTURE 0 PDS CT-2 (36PK/BX)

## (undated) DEVICE — TUBE E-T HI-LO CUFF 7.0MM (10EA/PK)

## (undated) DEVICE — SUCTION INSTRUMENT YANKAUER BULBOUS TIP W/O VENT (50EA/CA)

## (undated) DEVICE — WATER IRRIGATION STERILE 1000ML (12EA/CA)

## (undated) DEVICE — KIT ANESTHESIA W/CIRCUIT & 3/LT BAG W/FILTER (20EA/CA)

## (undated) DEVICE — CANISTER SUCTION RIGID RED 1500CC (40EA/CA)

## (undated) DEVICE — GOWN SURGEONS LARGE - (32/CA)

## (undated) DEVICE — GLOVE BIOGEL INDICATOR SZ 7SURGICAL PF LTX - (50/BX 4BX/CA)

## (undated) DEVICE — SET SUCTION/IRRIGATION WITH DISPOSABLE TIP (6/CA )PART #0250-070-520 IS A SUB

## (undated) DEVICE — KIT  I.V. START (100EA/CA)

## (undated) DEVICE — CATHETER IV 20 GA X 1-1/4 ---SURG.& SDS ONLY--- (50EA/BX)

## (undated) DEVICE — TUBING CLEARLINK DUO-VENT - C-FLO (48EA/CA)

## (undated) DEVICE — BAG RETRIEVAL 10ML (10EA/BX)

## (undated) DEVICE — ELECTRODE DUAL RETURN W/ CORD - (50/PK)

## (undated) DEVICE — SUTURE 4-0 MONOCRYL PLUS PS-2 - 27 INCH (36/BX)

## (undated) DEVICE — LACTATED RINGERS INJ 1000 ML - (14EA/CA 60CA/PF)

## (undated) DEVICE — CHLORAPREP 26 ML APPLICATOR - ORANGE TINT(25/CA)

## (undated) DEVICE — GLOVE BIOGEL PI INDICATOR SZ 7.0 SURGICAL PF LF - (50/BX 4BX/CA)

## (undated) DEVICE — NEPTUNE 4 PORT MANIFOLD - (20/PK)

## (undated) DEVICE — SET LEADWIRE 5 LEAD BEDSIDE DISPOSABLE ECG (1SET OF 5/EA)

## (undated) DEVICE — TUBE CONNECTING SUCTION - CLEAR PLASTIC STERILE 72 IN (50EA/CA)

## (undated) DEVICE — MASK ANESTHESIA ADULT  - (100/CA)

## (undated) DEVICE — ADHESIVE DERMABOND HVD MINI (12EA/BX)

## (undated) DEVICE — HEAD HOLDER JUNIOR/ADULT

## (undated) DEVICE — TUBING SETDISPOS HIGH FLOW II - (10/BX)

## (undated) DEVICE — SODIUM CHL IRRIGATION 0.9% 1000ML (12EA/CA)

## (undated) DEVICE — TROCAR 5X100 NON BLADED Z-TH - READ KII (6/BX)

## (undated) DEVICE — BANDAID X-LARGE 2 X 4 IN LF (50EA/BX)